# Patient Record
Sex: FEMALE | Race: WHITE | NOT HISPANIC OR LATINO | URBAN - NONMETROPOLITAN AREA
[De-identification: names, ages, dates, MRNs, and addresses within clinical notes are randomized per-mention and may not be internally consistent; named-entity substitution may affect disease eponyms.]

---

## 2021-08-13 ENCOUNTER — APPOINTMENT (OUTPATIENT)
Dept: URBAN - NONMETROPOLITAN AREA CLINIC 45 | Age: 31
Setting detail: DERMATOLOGY
End: 2021-08-25

## 2021-08-13 DIAGNOSIS — L71.8 OTHER ROSACEA: ICD-10-CM

## 2021-08-13 DIAGNOSIS — L91.0 HYPERTROPHIC SCAR: ICD-10-CM

## 2021-08-13 PROCEDURE — 11900 INJECT SKIN LESIONS </W 7: CPT

## 2021-08-13 PROCEDURE — 99203 OFFICE O/P NEW LOW 30 MIN: CPT | Mod: 25

## 2021-08-13 PROCEDURE — OTHER COUNSELING: OTHER

## 2021-08-13 PROCEDURE — OTHER INTRALESIONAL KENALOG: OTHER

## 2021-08-13 PROCEDURE — OTHER TREATMENT REGIMEN: OTHER

## 2021-08-13 PROCEDURE — OTHER PRESCRIPTION: OTHER

## 2021-08-13 RX ORDER — CLINDAMYCIN PHOSPHATE 10 MG/ML
LOTION TOPICAL TWICE A DAY
Qty: 1 | Refills: 5 | Status: ERX | COMMUNITY
Start: 2021-08-13

## 2021-08-13 RX ORDER — METRONIDAZOLE 7.5 MG/G
CREAM TOPICAL ONCE A DAY
Qty: 1 | Refills: 5 | Status: ERX | COMMUNITY
Start: 2021-08-13

## 2021-08-13 ASSESSMENT — LOCATION DETAILED DESCRIPTION DERM
LOCATION DETAILED: RIGHT SUPERIOR HELIX
LOCATION DETAILED: RIGHT INFERIOR HELIX

## 2021-08-13 ASSESSMENT — LOCATION ZONE DERM: LOCATION ZONE: EAR

## 2021-08-13 ASSESSMENT — LOCATION SIMPLE DESCRIPTION DERM: LOCATION SIMPLE: RIGHT EAR

## 2021-08-13 NOTE — PROCEDURE: TREATMENT REGIMEN
Plan: metronidazole 0.75 % topical cream Apply to face every night\\nclindamycin 1 % lotion Apply to face twice a day\\nWash with Cetaphil/CeraVe facial cleanser\\nMoisturize daily with Cetaphil/CeraVe facial moisturizer with spf
Detail Level: Zone

## 2021-08-13 NOTE — HPI: PIMPLES (ACNE)
Is This A New Presentation, Or A Follow-Up?: Acne
Additional Comments (Use Complete Sentences): Just had birth control implant removed 2 weeks ago.

## 2021-08-13 NOTE — HPI: SCAR (KELOIDS)
How Severe Are They?: moderate
Is This A New Presentation, Or A Follow-Up?: Scars
Additional History: Patient had keloids removed and injected with good improvement. She lost insurance and missed a few months of treatment and keloids have grown back

## 2021-09-15 ENCOUNTER — APPOINTMENT (OUTPATIENT)
Dept: URBAN - NONMETROPOLITAN AREA CLINIC 45 | Age: 31
Setting detail: DERMATOLOGY
End: 2021-09-15

## 2021-09-15 DIAGNOSIS — L91.0 HYPERTROPHIC SCAR: ICD-10-CM

## 2021-09-15 DIAGNOSIS — L71.8 OTHER ROSACEA: ICD-10-CM

## 2021-09-15 PROCEDURE — OTHER DEFER: OTHER

## 2021-09-15 PROCEDURE — OTHER PRESCRIPTION: OTHER

## 2021-09-15 PROCEDURE — 99214 OFFICE O/P EST MOD 30 MIN: CPT

## 2021-09-15 PROCEDURE — OTHER TREATMENT REGIMEN: OTHER

## 2021-09-15 PROCEDURE — OTHER COUNSELING: OTHER

## 2021-09-15 PROCEDURE — OTHER MIPS QUALITY: OTHER

## 2021-09-15 RX ORDER — AZELAIC ACID 0.15 G/G
GEL TOPICAL QD
Qty: 50 | Refills: 3 | Status: ERX | COMMUNITY
Start: 2021-09-15

## 2021-09-15 ASSESSMENT — LOCATION SIMPLE DESCRIPTION DERM: LOCATION SIMPLE: RIGHT EAR

## 2021-09-15 ASSESSMENT — LOCATION ZONE DERM: LOCATION ZONE: EAR

## 2021-09-15 ASSESSMENT — LOCATION DETAILED DESCRIPTION DERM: LOCATION DETAILED: RIGHT SUPERIOR HELIX

## 2021-09-15 NOTE — PROCEDURE: TREATMENT REGIMEN
Plan: Finacea 15 % topical gel Apply to rosacea amount once daily.\\nmetronidazole 0.75 % topical cream Apply to face every night\\nclindamycin 1 % lotion Apply to face twice a day\\nWash with Cetaphil/CeraVe facial cleanser\\nMoisturize daily with Cetaphil/CeraVe facial moisturizer with spf
Plan: Discussed potential for infection and possible need of oral antibiotic post procedure
Detail Level: Zone

## 2021-10-19 ENCOUNTER — APPOINTMENT (OUTPATIENT)
Dept: URBAN - NONMETROPOLITAN AREA CLINIC 45 | Age: 31
Setting detail: DERMATOLOGY
End: 2021-10-19

## 2021-10-19 DIAGNOSIS — L91.0 HYPERTROPHIC SCAR: ICD-10-CM

## 2021-10-19 PROCEDURE — OTHER TREATMENT REGIMEN: OTHER

## 2021-10-19 ASSESSMENT — LOCATION ZONE DERM: LOCATION ZONE: EAR

## 2021-10-19 ASSESSMENT — LOCATION SIMPLE DESCRIPTION DERM: LOCATION SIMPLE: RIGHT EAR

## 2021-10-19 ASSESSMENT — LOCATION DETAILED DESCRIPTION DERM: LOCATION DETAILED: RIGHT SUPERIOR HELIX

## 2021-10-19 NOTE — PROCEDURE: TREATMENT REGIMEN
Detail Level: Simple
Otc Regimen: Scar away silicone sheets and gel for post-procedure
Plan: Kenalog 10 MG/mL 0.3 mL injected into large keloid. Risks of atrophy reviewed. I recommended ILTAC prior to excision for best results. Area cleansed in appropriate manor with alcohol, pressure bandage applied. After discussion of risks and benefits of various treatment options, I decided against trying a shave excision due to circumferential growth and large size. Patient was disappointed but I think the risk of infection, deformity and regrowth of keloid would not leave the patient with a satisfactory result. Procedure was provided complimentary due to patient inconvenience. \\n\\nWill refer Patient to Dr. Taylor for excision. Will follow up with patient post-excision for additional ILTAC injections and for acne treatment.

## 2021-10-22 ENCOUNTER — APPOINTMENT (OUTPATIENT)
Dept: URBAN - NONMETROPOLITAN AREA CLINIC 45 | Age: 31
Setting detail: DERMATOLOGY
End: 2021-10-22

## 2021-10-22 DIAGNOSIS — L91.0 HYPERTROPHIC SCAR: ICD-10-CM

## 2021-10-22 PROCEDURE — OTHER REFERRAL: OTHER

## 2021-10-22 ASSESSMENT — LOCATION SIMPLE DESCRIPTION DERM: LOCATION SIMPLE: RIGHT EAR

## 2021-10-22 ASSESSMENT — LOCATION ZONE DERM: LOCATION ZONE: EAR

## 2021-10-22 ASSESSMENT — LOCATION DETAILED DESCRIPTION DERM: LOCATION DETAILED: RIGHT SUPERIOR CRUS OF ANTIHELIX

## 2022-02-16 ENCOUNTER — APPOINTMENT (OUTPATIENT)
Dept: URBAN - NONMETROPOLITAN AREA CLINIC 45 | Age: 32
Setting detail: DERMATOLOGY
End: 2022-02-17

## 2022-02-16 DIAGNOSIS — L91.0 HYPERTROPHIC SCAR: ICD-10-CM

## 2022-02-16 PROCEDURE — OTHER INTRALESIONAL KENALOG: OTHER

## 2022-02-16 PROCEDURE — OTHER TREATMENT REGIMEN: OTHER

## 2022-02-16 PROCEDURE — 11900 INJECT SKIN LESIONS </W 7: CPT

## 2022-02-16 PROCEDURE — OTHER MIPS QUALITY: OTHER

## 2022-02-16 ASSESSMENT — LOCATION DETAILED DESCRIPTION DERM: LOCATION DETAILED: RIGHT SUPERIOR HELIX

## 2022-02-16 ASSESSMENT — LOCATION ZONE DERM: LOCATION ZONE: EAR

## 2022-02-16 ASSESSMENT — LOCATION SIMPLE DESCRIPTION DERM: LOCATION SIMPLE: RIGHT EAR

## 2022-02-16 NOTE — PROCEDURE: TREATMENT REGIMEN
Otc Regimen: Scar away silicone sheets and gel for post-procedure
Plan: Will refer Patient to Dr. Sykes for excision if not improved at follow up.
Detail Level: Simple

## 2022-03-16 ENCOUNTER — APPOINTMENT (OUTPATIENT)
Dept: URBAN - NONMETROPOLITAN AREA CLINIC 45 | Age: 32
Setting detail: DERMATOLOGY
End: 2022-03-17

## 2022-03-16 DIAGNOSIS — L91.0 HYPERTROPHIC SCAR: ICD-10-CM

## 2022-03-16 PROCEDURE — 99212 OFFICE O/P EST SF 10 MIN: CPT | Mod: 25

## 2022-03-16 PROCEDURE — OTHER TREATMENT REGIMEN: OTHER

## 2022-03-16 PROCEDURE — OTHER INTRALESIONAL KENALOG: OTHER

## 2022-03-16 PROCEDURE — OTHER MIPS QUALITY: OTHER

## 2022-03-16 PROCEDURE — 11900 INJECT SKIN LESIONS </W 7: CPT

## 2022-03-16 ASSESSMENT — LOCATION SIMPLE DESCRIPTION DERM: LOCATION SIMPLE: RIGHT EAR

## 2022-03-16 ASSESSMENT — LOCATION ZONE DERM: LOCATION ZONE: EAR

## 2022-03-16 ASSESSMENT — LOCATION DETAILED DESCRIPTION DERM: LOCATION DETAILED: RIGHT SUPERIOR HELIX

## 2022-03-16 NOTE — PROCEDURE: TREATMENT REGIMEN
Otc Regimen: Scar away silicone sheets and gel for post-procedure
Plan: Will refer Patient to Dr. Sykes for excision
8
Detail Level: Simple

## 2022-03-17 ENCOUNTER — APPOINTMENT (OUTPATIENT)
Dept: URBAN - NONMETROPOLITAN AREA CLINIC 45 | Age: 32
Setting detail: DERMATOLOGY
End: 2022-03-17

## 2022-03-17 DIAGNOSIS — L91.0 HYPERTROPHIC SCAR: ICD-10-CM

## 2022-03-17 PROCEDURE — OTHER REFERRAL: OTHER

## 2022-03-17 ASSESSMENT — LOCATION DETAILED DESCRIPTION DERM: LOCATION DETAILED: RIGHT SUPERIOR HELIX

## 2022-03-17 ASSESSMENT — LOCATION SIMPLE DESCRIPTION DERM: LOCATION SIMPLE: RIGHT EAR

## 2022-03-17 ASSESSMENT — LOCATION ZONE DERM: LOCATION ZONE: EAR

## 2022-04-27 ENCOUNTER — APPOINTMENT (OUTPATIENT)
Dept: URBAN - NONMETROPOLITAN AREA CLINIC 45 | Age: 32
Setting detail: DERMATOLOGY
End: 2022-04-27

## 2022-04-27 DIAGNOSIS — L91.0 HYPERTROPHIC SCAR: ICD-10-CM

## 2022-04-27 PROCEDURE — 11900 INJECT SKIN LESIONS </W 7: CPT

## 2022-04-27 PROCEDURE — OTHER TREATMENT REGIMEN: OTHER

## 2022-04-27 PROCEDURE — OTHER INTRALESIONAL KENALOG: OTHER

## 2022-04-27 ASSESSMENT — LOCATION SIMPLE DESCRIPTION DERM: LOCATION SIMPLE: RIGHT EAR

## 2022-04-27 ASSESSMENT — LOCATION DETAILED DESCRIPTION DERM: LOCATION DETAILED: RIGHT SUPERIOR HELIX

## 2022-04-27 ASSESSMENT — LOCATION ZONE DERM: LOCATION ZONE: EAR

## 2022-04-27 NOTE — PROCEDURE: TREATMENT REGIMEN
Detail Level: Simple
Otc Regimen: Scar away silicone sheets and gel for post-procedure
Plan: Patient to keep upcoming appointment with Dr. Sykes for excision
